# Patient Record
Sex: FEMALE | Race: WHITE | NOT HISPANIC OR LATINO | Employment: FULL TIME | URBAN - METROPOLITAN AREA
[De-identification: names, ages, dates, MRNs, and addresses within clinical notes are randomized per-mention and may not be internally consistent; named-entity substitution may affect disease eponyms.]

---

## 2018-01-27 ENCOUNTER — HOSPITAL ENCOUNTER (EMERGENCY)
Facility: HOSPITAL | Age: 63
Discharge: HOME | End: 2018-01-27
Attending: EMERGENCY MEDICINE
Payer: COMMERCIAL

## 2018-01-27 VITALS
HEART RATE: 82 BPM | DIASTOLIC BLOOD PRESSURE: 68 MMHG | HEIGHT: 64 IN | BODY MASS INDEX: 40.97 KG/M2 | OXYGEN SATURATION: 97 % | WEIGHT: 240 LBS | RESPIRATION RATE: 17 BRPM | TEMPERATURE: 98 F | SYSTOLIC BLOOD PRESSURE: 143 MMHG

## 2018-01-27 DIAGNOSIS — M25.562 LEFT KNEE PAIN: ICD-10-CM

## 2018-01-27 PROCEDURE — 99284 EMERGENCY DEPT VISIT MOD MDM: CPT

## 2018-01-27 RX ORDER — CHOLECALCIFEROL (VITAMIN D3) 25 MCG
2000 TABLET ORAL
COMMUNITY

## 2018-01-27 RX ORDER — CLOBETASOL PROPIONATE 0.46 MG/ML
SOLUTION TOPICAL 2 TIMES DAILY
COMMUNITY

## 2018-01-27 RX ORDER — LEVOTHYROXINE SODIUM 75 UG/1
75 TABLET ORAL DAILY
COMMUNITY

## 2018-01-27 RX ORDER — LOSARTAN POTASSIUM 50 MG/1
50 TABLET ORAL DAILY
COMMUNITY

## 2018-01-27 RX ORDER — AMOXICILLIN 500 MG
CAPSULE ORAL DAILY
COMMUNITY

## 2018-01-27 RX ORDER — SULINDAC 150 MG/1
150 TABLET ORAL 2 TIMES DAILY
Qty: 10 TABLET | Refills: 0 | Status: SHIPPED | OUTPATIENT
Start: 2018-01-27

## 2018-01-27 NOTE — LETTER
January 27, 2018    Jacki Clinton  [address] [place] [id number]                [address] [place] [address]  Phone: [phone] To whom it may concern:    Ms. Clinton was seen in the Emergency Department today and has suffered a temporarily debilitating injury.  Please allow her and her party to rebook travel arrangements for a later date.    If you have any questions or concerns, please don't hesitate to call.    Sincerely,        Ilan Mclean, DNP

## 2018-01-28 NOTE — ED PROVIDER NOTES
Encounter Date: 1/27/2018       History     Chief Complaint   Patient presents with    Knee Pain     left knee after stepping up onto streetcar; reports has had left knee issues in the past and received steroid shot 2 wks ago     Chief complaint: Knee pain    History of present illness: Patient states that her left knee has been painful for month and 2 weeks ago she underwent an intra-articular joint injection then today she was boarding a street car when she stepped up she felt and heard a pop causing excruciating pain to the left knee.  It is now a sticking type pain.  Aleve has been ineffective.  It is aggravated by weight bearing but not necessarily by direct touch or movement.  Pain does not radiate.  Current severity pain is 10 over 10.      The history is provided by the patient. No  was used.     Review of patient's allergies indicates:  No Known Allergies  Past Medical History:   Diagnosis Date    Knee pain, left     Thyroid cancer     Thyroid disease      Past Surgical History:   Procedure Laterality Date    PARATHYROID GLAND SURGERY      parathyroidectomy Left 09/01/2017    THYROIDECTOMY, PARTIAL Left 09/01/2017     History reviewed. No pertinent family history.  Social History   Substance Use Topics    Smoking status: Never Smoker    Smokeless tobacco: Never Used    Alcohol use No     Review of Systems   Constitutional: Negative for chills, fatigue and fever.   HENT: Negative for congestion, ear discharge, ear pain, postnasal drip, rhinorrhea, sinus pressure, sneezing, sore throat and voice change.    Eyes: Negative for discharge and itching.   Respiratory: Negative for cough, shortness of breath and wheezing.    Cardiovascular: Negative for chest pain, palpitations and leg swelling.   Gastrointestinal: Negative for abdominal pain, constipation, diarrhea, nausea and vomiting.   Endocrine: Negative for polydipsia, polyphagia and polyuria.   Genitourinary: Negative for  dysuria, frequency, hematuria, urgency, vaginal bleeding, vaginal discharge and vaginal pain.   Musculoskeletal: Positive for arthralgias. Negative for myalgias.   Skin: Negative for rash and wound.   Neurological: Negative for dizziness, seizures, syncope, weakness and numbness.   Hematological: Negative for adenopathy. Does not bruise/bleed easily.   Psychiatric/Behavioral: Negative for self-injury and suicidal ideas. The patient is not nervous/anxious.        Physical Exam     Initial Vitals [01/27/18 1538]   BP Pulse Resp Temp SpO2   (!) 161/77 73 20 96.4 °F (35.8 °C) 99 %      MAP       105         Physical Exam    Nursing note and vitals reviewed.  Constitutional: She appears well-developed and well-nourished.   HENT:   Head: Normocephalic and atraumatic.   Right Ear: External ear normal.   Left Ear: External ear normal.   Nose: Nose normal.   Eyes: Conjunctivae and EOM are normal. Pupils are equal, round, and reactive to light. Right eye exhibits no discharge. Left eye exhibits no discharge.   Neck: Normal range of motion.   Abdominal: She exhibits no distension.   Musculoskeletal:        Left knee: She exhibits normal range of motion, no swelling, no effusion, no ecchymosis, no deformity, no laceration, no erythema, normal alignment, no LCL laxity, normal patellar mobility, no bony tenderness, normal meniscus and no MCL laxity. No tenderness found.   Neurological: She is alert and oriented to person, place, and time.   Skin: Skin is dry. Capillary refill takes less than 2 seconds.         ED Course   Procedures  Labs Reviewed - No data to display          Medical Decision Making:   Initial Assessment:   62-year-old female presents for left knee pain that has been chronic for several months but worsened suddenly today after stepping onto a street car and hearing a pop  Differential Diagnosis:   Differential diagnosis includes fracture, dislocation, osteoarthritis flare, tenderness damage, ligamentous  damage  ED Management:  Following a thorough history and physical, discussion was had with the patient and she requested CT scan and this was not indicated modality for nontraumatic injury.  After discussion with Dr. Maya we decided to get a plain film and ultrasound which was performed and both were negative.  Indication WAS TO RULE OUT VENOUS THROMBOSIS.  Patient was offered a knee immobilizer but declined and preferred to use Ace wrap only, a walker and anti-inflammatory was ordered for her discharge.  Patient understands she should use rest, ice, compression, elevation as frequently as possible and follow-up with orthopedic consult as soon as possible.  A letter was furnished for rescheduling of cruise that the patient is scheduled to embark on tomorrow  Other:   I have discussed this case with another health care provider.       <> Summary of the Discussion: Care of the patient discussed with Dr. Maya who agreed with the assessment and plan.                  Attending Attestation:     Physician Attestation Statement for NP/PA:   I discussed this assessment and plan of this patient with the NP/PA, but I did not personally examine the patient. The face to face encounter was performed by the NP/PA.                  ED Course as of Jan 27 1849   Sat Jan 27, 2018   1731 No acute displaced fracture or dislocation of the knee.  No radiopaque foreign body.  No significant knee joint effusion. X-Ray Knee 3 View Left [VC]      ED Course User Index  [VC] Ilan Mclean DNP     Clinical Impression:   The encounter diagnosis was Left knee pain.    Disposition:   Disposition: Discharged  Condition: Stable                        Ilan Mclean DNP  01/27/18 1856       Bill Maya MD  02/02/18 0856

## 2018-01-28 NOTE — DISCHARGE INSTRUCTIONS
Obtain walker as ordered and use to prevent weight bearing.  Practice rest, ice, compression and elevation of the joint as much as possible.  Clinoril (sulindac) should be taken as ordered, even if feeling better.  Follow up with an orthopedic provider as soon as possible.  Return to the Emergency department for any worsening or failure to improve, otherwise follow up with your primary care provider.